# Patient Record
(demographics unavailable — no encounter records)

---

## 2024-10-29 NOTE — HISTORY OF PRESENT ILLNESS
[FreeTextEntry1] : 53-year-old -0-1-2 last menstrual cycle was October 10, 2024 for 5 days presents with complaint of nocturia and urinary frequency.  Denies dysuria or any evidence of infection.

## 2024-10-29 NOTE — PHYSICAL EXAM
[Chaperone Present] : A chaperone was present in the examining room during all aspects of the physical examination [40812] : A chaperone was present during the pelvic exam. [FreeTextEntry2] : porsha [Appropriately responsive] : appropriately responsive [Alert] : alert [No Acute Distress] : no acute distress [No Lymphadenopathy] : no lymphadenopathy [Regular Rate Rhythm] : regular rate rhythm [No Murmurs] : no murmurs [Clear to Auscultation B/L] : clear to auscultation bilaterally [Soft] : soft [Non-tender] : non-tender [Non-distended] : non-distended [No HSM] : No HSM [No Lesions] : no lesions [No Mass] : no mass [Oriented x3] : oriented x3 [Examination Of The Breasts] : a normal appearance [No Masses] : no breast masses were palpable [Labia Majora] : normal [Labia Minora] : normal [Normal] : normal [Tenderness] : tender [Uterine Adnexae] : normal [Declined] : Patient declined rectal exam

## 2024-10-29 NOTE — DISCUSSION/SUMMARY
[FreeTextEntry1] : 53-year-old -0-1-2 last menstrual cycle was October 10, 2024 presents with menorrhagia and urinary frequency.  She denies urinary urgency or stress incontinence and physical exam showed a 16 weeks size globular uterus.  Pap GC chlamydia sent.  Mammogram and breast sonogram ordered.  Pelvic sonogram ordered. Diet and exercise reviewed. Safe sexual practices reviewed Return to the office in 6 weeks for follow-up.

## 2024-12-05 NOTE — PROCEDURE
[Hysteroscopy] : Hysteroscopy [Time out performed] : Pre-procedure time out performed.  Patient's name, date of birth and procedure confirmed. [Consent Obtained] : Consent obtained [Abnormal uterine bleeding] : abnormal uterine bleeding [Risks] : risks [Benefits] : benefits [Alternatives] : alternatives [Patient] : patient [Infection] : infection [Bleeding] : bleeding [Allergic Reaction] : allergic reaction [Lidocaine___ mL] : [unfilled] ~UmL of lidocaine [flexible] : Using aseptic technique a hysteroscopy was performed using a flexible hysteroscope [Sent to Pathology] : specimen was placed in buffered formalin and sent for pathology [Antibiotics given] : antibiotics not given [Hemostasis obtained] : hemostasis obtained [Tolerated Well] : Patient tolerated the procedure well [Aftercare instructions/regstrictions given and follow-up scheduled] : Aftercare instructions/restrictions given and follow-up scheduled [de-identified] : Under sterile condition and with paracervical block the cervix was dilated and endometrial sampling obtained and sent to pathology.  Hysteroscopic evaluation shows irregular endometrial cavity with atrophic type features.  No polyps or myomas noted inside.  Patient tolerated the procedure well. No

## 2025-01-13 NOTE — DISCUSSION/SUMMARY
[FreeTextEntry1] : 53-year-old -0-1-2 status post endometrial sampling hysteroscopy and pathology failed to show any endometrial tissue so patient was given 2 options either to have it repeated or have a D&C hysteroscopy at the ambulatory center.  She will think it through and let me know.  Patient is being followed by urologist and is presently on Flomax for urinary frequency.  All questions answered.

## 2025-01-13 NOTE — HISTORY OF PRESENT ILLNESS
[FreeTextEntry1] : 53-year-old -0-1-2 status post endometrial sampling hysteroscopy and pathology failed to show any endometrial cells.  Patient offered 2 options either repeat endometrial sampling hysteroscopy here or under anesthesia at the ambulatory center, she will think it through and let me know.  She is being followed by urologist for urinary frequency and she was placed on Flomax for now.